# Patient Record
Sex: MALE | URBAN - METROPOLITAN AREA
[De-identification: names, ages, dates, MRNs, and addresses within clinical notes are randomized per-mention and may not be internally consistent; named-entity substitution may affect disease eponyms.]

---

## 2020-10-03 ENCOUNTER — TELEPHONE (OUTPATIENT)
Dept: GASTROENTEROLOGY | Age: 70
End: 2020-10-03

## 2020-10-03 NOTE — TELEPHONE ENCOUNTER
I called, seems someone picked up and hung up. unable to reach by phone. Letter mailed to patient. Copy to Dr Annamaria Ashby.

## 2020-10-03 NOTE — LETTER
10/3/2020 2:29 PM 
 
Mr. Kathe Fernández [address] [place] [address] Dr Joslyn Chambers [id number] Dear Lauryn Gonzales,  
 
Dr Mamadou Cook has referred you for a screening colonoscopy. Please call our office at [phone] so we may discuss this. Sorry we were unable to reach you by phone. Sincerely, Nikita Roblero MD

## 2023-07-15 ENCOUNTER — HOSPITAL ENCOUNTER (EMERGENCY)
Facility: HOSPITAL | Age: 73
Discharge: HOME | End: 2023-07-15
Attending: EMERGENCY MEDICINE
Payer: MEDICARE

## 2023-07-15 ENCOUNTER — APPOINTMENT (OUTPATIENT)
Facility: HOSPITAL | Age: 73
End: 2023-07-15
Payer: MEDICARE

## 2023-07-15 VITALS
TEMPERATURE: 99.1 F | BODY MASS INDEX: 24.5 KG/M2 | OXYGEN SATURATION: 97 % | HEIGHT: 71 IN | WEIGHT: 175 LBS | HEART RATE: 89 BPM | DIASTOLIC BLOOD PRESSURE: 92 MMHG | RESPIRATION RATE: 18 BRPM | SYSTOLIC BLOOD PRESSURE: 144 MMHG
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
## 2023-07-15 PROCEDURE — 73120 X-RAY EXAM OF HAND: CPT

## 2023-07-15 PROCEDURE — 6360000002 HC RX W HCPCS: Performed by: EMERGENCY MEDICINE

## 2023-07-15 PROCEDURE — 99284 EMERGENCY DEPT VISIT MOD MDM: CPT

## 2023-07-15 PROCEDURE — 90471 IMMUNIZATION ADMIN: CPT | Performed by: EMERGENCY MEDICINE

## 2023-07-15 PROCEDURE — 90714 TD VACC NO PRESV 7 YRS+ IM: CPT | Performed by: EMERGENCY MEDICINE

## 2023-07-15 PROCEDURE — 6370000000 HC RX 637 (ALT 250 FOR IP): Performed by: EMERGENCY MEDICINE

## 2023-07-15 RX ORDER — AMOXICILLIN AND CLAVULANATE POTASSIUM 875; 125 MG/1; MG/1
1 TABLET, FILM COATED ORAL 2 TIMES DAILY
Qty: 14 TABLET | Refills: 0 | Status: SHIPPED | OUTPATIENT
Start: 2023-07-15 | End: 2023-07-22

## 2023-07-15 RX ORDER — TETANUS AND DIPHTHERIA TOXOIDS ADSORBED 2; 2 [LF]/.5ML; [LF]/.5ML
0.5 INJECTION INTRAMUSCULAR ONCE
Status: COMPLETED | OUTPATIENT
Start: 2023-07-15 | End: 2023-07-15

## 2023-07-15 RX ORDER — AMOXICILLIN AND CLAVULANATE POTASSIUM 875; 125 MG/1; MG/1
1 TABLET, FILM COATED ORAL
Status: COMPLETED | OUTPATIENT
Start: 2023-07-15 | End: 2023-07-15

## 2023-07-15 RX ADMIN — AMOXICILLIN AND CLAVULANATE POTASSIUM 1 TABLET: 875; 125 TABLET, FILM COATED ORAL at 14:56

## 2023-07-15 RX ADMIN — TETANUS AND DIPHTHERIA TOXOIDS ADSORBED 0.5 ML: 2; 2 INJECTION INTRAMUSCULAR at 14:56

## 2023-07-15 ASSESSMENT — PAIN DESCRIPTION - DESCRIPTORS: DESCRIPTORS: NUMBNESS;TINGLING

## 2023-07-15 ASSESSMENT — PAIN DESCRIPTION - LOCATION: LOCATION: HAND

## 2023-07-15 ASSESSMENT — LIFESTYLE VARIABLES
HOW MANY STANDARD DRINKS CONTAINING ALCOHOL DO YOU HAVE ON A TYPICAL DAY: 3 OR 4
HOW OFTEN DO YOU HAVE A DRINK CONTAINING ALCOHOL: 4 OR MORE TIMES A WEEK

## 2023-07-15 ASSESSMENT — PAIN - FUNCTIONAL ASSESSMENT: PAIN_FUNCTIONAL_ASSESSMENT: 0-10

## 2023-07-15 ASSESSMENT — PAIN DESCRIPTION - ORIENTATION: ORIENTATION: RIGHT;LEFT

## 2023-07-15 ASSESSMENT — PAIN SCALES - GENERAL: PAINLEVEL_OUTOF10: 6

## 2023-07-15 NOTE — DISCHARGE INSTRUCTIONS
Please try to find out the dog's vaccination status. If you are unable to find out the dog is vaccinated against rabies or quarantined the dog and watch it for signs of rabies for 10 days you should come back for rabies vaccination.

## 2023-07-15 NOTE — ED TRIAGE NOTES
Per EMS, pt is intoxicated, got into argument with girlfriend this morning, pts dog bit him in both his hands and forearms. Pt has punctures and lacerations on both forearms and hands. Pt also complaining of numbness in hands. Tetanus was 35 years ago.

## 2023-07-15 NOTE — ED PROVIDER NOTES
[facility] EMERGENCY DEPT  EMERGENCY DEPARTMENT HISTORY AND PHYSICAL EXAM      Date: 7/15/2023  Patient Name: Khloe Alcantara  MRN: [id number]  YOB: 1950  Date of evaluation: 7/15/2023  Provider: Venita Arredondo MD   Note Started: 2:48 PM EDT 7/15/23    HISTORY OF PRESENT ILLNESS     Chief Complaint   Patient presents with    Animal Bite       History Provided By: Patient    HPI: Khloe Alcantara is a 68 y.o. male here with lacerations to his bilateral hands. Patient was bit by his girlfriends dog. They have had this dog from the age of 1 months and is now currently 9 months. It stays at home has always been in their site. He does not know the vaccination status. PAST MEDICAL HISTORY   Past Medical History:  History reviewed. No pertinent past medical history. Past Surgical History:  History reviewed. No pertinent surgical history. Family History:  History reviewed. No pertinent family history. Social History:  Social History     Tobacco Use    Smoking status: Every Day     Packs/day: 0.50     Types: Cigarettes    Smokeless tobacco: Never   Vaping Use    Vaping Use: Never used   Substance Use Topics    Alcohol use: Yes    Drug use: Never       Allergies:  No Known Allergies    PCP: Climmie Gottron, MD    Current Meds:   No current facility-administered medications for this encounter.      Current Outpatient Medications   Medication Sig Dispense Refill    amoxicillin-clavulanate (AUGMENTIN) 875-125 MG per tablet Take 1 tablet by mouth 2 times daily for 7 days 14 tablet 0       Social Determinants of Health:   Social Determinants of Health     Tobacco Use: High Risk    Smoking Tobacco Use: Every Day    Smokeless Tobacco Use: Never    Passive Exposure: Not on file   Alcohol Use: Heavy Drinker    Frequency of Alcohol Consumption: 4 or more times a week    Average Number of Drinks: 3 or 4    Frequency of Binge Drinking: Monthly   Financial Resource Strain: Not on file   Food Insecurity: Not on file

## 2025-07-12 ENCOUNTER — HOSPITAL ENCOUNTER (EMERGENCY)
Facility: HOSPITAL | Age: 75
Discharge: HOME | End: 2025-07-12
Attending: EMERGENCY MEDICINE
Payer: MEDICARE

## 2025-07-12 VITALS
OXYGEN SATURATION: 95 % | RESPIRATION RATE: 18 BRPM | DIASTOLIC BLOOD PRESSURE: 61 MMHG | HEIGHT: 71 IN | SYSTOLIC BLOOD PRESSURE: 136 MMHG | BODY MASS INDEX: 22.82 KG/M2 | WEIGHT: 163 LBS | HEART RATE: 88 BPM | TEMPERATURE: 98.3 F

## 2025-07-12 DIAGNOSIS — R33.9 URINARY RETENTION: Primary | ICD-10-CM

## 2025-07-12 LAB
APPEARANCE UR: CLEAR
BACTERIA URNS QL MICRO: NEGATIVE /HPF
BILIRUB UR QL: NEGATIVE
COLOR UR: ABNORMAL
GLUCOSE UR STRIP.AUTO-MCNC: NEGATIVE MG/DL
HGB UR QL STRIP: ABNORMAL
KETONES UR QL STRIP.AUTO: NEGATIVE MG/DL
LEUKOCYTE ESTERASE UR QL STRIP.AUTO: NEGATIVE
NITRITE UR QL STRIP.AUTO: NEGATIVE
PH UR STRIP: 6 (ref 5–8)
PROT UR STRIP-MCNC: NEGATIVE MG/DL
RBC #/AREA URNS HPF: ABNORMAL /HPF (ref 0–3)
SP GR UR REFRACTOMETRY: <1.005 (ref 1–1.03)
URINE CULTURE IF INDICATED: ABNORMAL
UROBILINOGEN UR QL STRIP.AUTO: 0.2 EU/DL (ref 0.2–1)
WBC URNS QL MICRO: ABNORMAL /HPF (ref 0–5)

## 2025-07-12 PROCEDURE — 51702 INSERT TEMP BLADDER CATH: CPT

## 2025-07-12 PROCEDURE — 81001 URINALYSIS AUTO W/SCOPE: CPT

## 2025-07-12 PROCEDURE — 51798 US URINE CAPACITY MEASURE: CPT

## 2025-07-12 PROCEDURE — 99283 EMERGENCY DEPT VISIT LOW MDM: CPT

## 2025-07-12 RX ORDER — TAMSULOSIN HYDROCHLORIDE 0.4 MG/1
0.4 CAPSULE ORAL DAILY
Qty: 30 CAPSULE | Refills: 0 | Status: SHIPPED | OUTPATIENT
Start: 2025-07-12

## 2025-07-12 RX ORDER — AMLODIPINE BESYLATE 2.5 MG/1
2.5 TABLET ORAL DAILY
COMMUNITY

## 2025-07-12 ASSESSMENT — PAIN SCALES - GENERAL: PAINLEVEL_OUTOF10: 10

## 2025-07-12 ASSESSMENT — LIFESTYLE VARIABLES
HOW MANY STANDARD DRINKS CONTAINING ALCOHOL DO YOU HAVE ON A TYPICAL DAY: PATIENT DOES NOT DRINK
HOW OFTEN DO YOU HAVE A DRINK CONTAINING ALCOHOL: NEVER

## 2025-07-12 ASSESSMENT — PAIN DESCRIPTION - ORIENTATION: ORIENTATION: LOWER

## 2025-07-12 ASSESSMENT — PAIN DESCRIPTION - LOCATION: LOCATION: ABDOMEN

## 2025-07-12 ASSESSMENT — PAIN DESCRIPTION - DESCRIPTORS: DESCRIPTORS: SHARP

## 2025-07-12 ASSESSMENT — PAIN - FUNCTIONAL ASSESSMENT: PAIN_FUNCTIONAL_ASSESSMENT: 0-10

## 2025-07-12 NOTE — ED PROVIDER NOTES
[facility] EMERGENCY DEPT  EMERGENCY DEPARTMENT HISTORY AND PHYSICAL EXAM      Date of evaluation: 7/12/2025  Patient Name: Kemar Thorne  Birthdate 1950  MRN: [id number]  ED Provider: Scott Marquez MD   Note Started: 7:34 AM EDT 7/12/25    HISTORY OF PRESENT ILLNESS     Chief Complaint   Patient presents with    Urinary Retention       History Provided By: Patient, only     HPI: Kemar Thorne is a 75 y.o. male This note was generated using [employer] AI software. If any parties were recorded to generate this note outside of the software user, their consent was obtained prior to recording their voices. All portions of this note were verified by the clinician prior to inclusion in the medical record.    Chief Complaint  - Difficulty urinating for \"2, 3 days\"    History of Present Illness  Mr. Kemar Thorne, a 75-year-old male, presents to the ED with difficulty urinating for approximately 3 weeks, which worsened this morning. The patient reports being unable to urinate for the past 2-3 days. He states he \"thought I could do it, but I couldn't do it.\" Mr. Thorne denies any known kidney problems, nausea, or vomiting. He also denies any history of prostate problems or similar urinary issues in the past.    Review of Systems  All pertinent positives and negatives from ROS in the HPI.    Review of Systems  Gastrointestinal: Negative for nausea, vomiting.  Genitourinary: Positive for difficulty urinating.    Medical History  - Urinary retention for approximately 2-3 days    Surgical History  - Suprapubic catheter placement approximately 3 weeks ago    PAST MEDICAL HISTORY   Past Medical History:  Past Medical History:   Diagnosis Date    Hypertension        Past Surgical History:  History reviewed. No pertinent surgical history.    Family History:  History reviewed. No pertinent family history.    Social History:  Social History     Tobacco Use    Smoking status: Every Day     Current packs/day: 0.50     Types:  [facility] ([facility]  - None    Critical Care  None     Vitals:    Vitals:    07/12/25 0537 07/12/25 0546 07/12/25 0630   BP: (!) 208/107 (!) 206/110 136/61   Pulse: 88     Resp: 18     Temp: 98.3 °F (36.8 °C)     TempSrc: Oral     SpO2: 95%     Weight: 73.9 kg (163 lb)     Height: 1.803 m (5' 11\")         ED COURSE       Patient was given the following medications:  Medications - No data to display    CONSULTS: See ED Course/MDM for further details.  None   PROCEDURES   Unless otherwise noted above, none  Procedures    CLINICAL IMPRESSIONS     1. Urinary retention       SDOH/DISPOSITION/PLAN   Social Determinants affecting Treatment Plan: None    DISPOSITION Decision To Discharge 07/12/2025 06:21:25 AM   DISPOSITION CONDITION Stable         Discharge Note: The patient is stable for discharge home. The signs, symptoms, diagnosis, and discharge instructions have been discussed, understanding conveyed, and agreed upon. The patient is to follow up as recommended or return to ER should their symptoms worsen.      PATIENT REFERRED TO:  Raul Hernandez MD  [address] [id number] [phone]    Schedule an appointment as soon as possible for a visit in 1 week  for followup    [facility]  [address] [place] [address]  [address]  [place] [address]  [phone]  Schedule an appointment as soon as possible for a visit in 1 week  Call our -7 [facility] Hotline. [phone]. We're here to help.        DISCHARGE MEDICATIONS:     Medication List        START taking these medications      tamsulosin 0.4 MG capsule  Commonly known as: FLOMAX  Take 1 capsule by mouth daily            ASK your doctor about these medications      amLODIPine 2.5 MG tablet  Commonly known as: NORVASC               Where to Get Your Medications        [facility] [address] [place], [place] - [address] - P [phone] - F [phone]  [address] [place] [id number]      Phone: [phone]   tamsulosin

## 2025-07-12 NOTE — DISCHARGE INSTRUCTIONS
Thank you for choosing our Emergency Department for your care.  It is our privilege to care for you in your time of need.  In the next several days, you may receive a survey via email or mailed to your home about your experience with our team.  We would greatly appreciate you taking a few minutes to complete the survey, as we use this information to learn what we have done well and what we could be doing better. Thank you for trusting us with your care!    Below you will find a list of your tests from today's visit.   Labs and Radiology Studies  Recent Results (from the past 12 hours)   Urinalysis with Reflex to Culture    Collection Time: 07/12/25  6:00 AM    Specimen: Urine   Result Value Ref Range    Color, UA Yellow/Straw      Appearance Clear Clear      Specific Gravity, UA <1.005 1.003 - 1.030    pH, Urine 6.0 5.0 - 8.0      Protein, UA Negative Negative mg/dL    Glucose, Ur Negative Negative mg/dL    Ketones, Urine Negative Negative mg/dL    Bilirubin, Urine Negative Negative      Blood, Urine Large (A) Negative      Urobilinogen, Urine 0.2 0.2 - 1.0 EU/dL    Nitrite, Urine Negative Negative      Leukocyte Esterase, Urine Negative Negative      WBC, UA 0-4 0 - 5 /hpf    RBC, UA 10-20 0 - 3 /hpf    BACTERIA, URINE Negative Negative /hpf    Urine Culture if Indicated Culture not indicated by UA result Culture not indicated by UA result       No results found.  ------------------------------------------------------------------------------------------------------------  The evaluation and treatment you received in the Emergency Department were for an urgent problem. It is important that you follow-up with a doctor, nurse practitioner, or physician assistant to:  (1) confirm your diagnosis,  (2) re-evaluation of changes in your illness and treatment, and (3) for ongoing care. Please take your discharge instructions with you when you go to your follow-up appointment.     If you have any problem arranging a

## 2025-07-12 NOTE — ED TRIAGE NOTES
Pt arrives via EMS with CC difficulty urinating \"off and on\" x3 weeks. States he was unable to pee this morning and was experiencing increased suprapubic pain with distension.    Stable at this time.  Does remain on Keppra.  He is followed by neurology but I refilled his Keppra for him.

## 2025-08-03 ENCOUNTER — HOSPITAL ENCOUNTER (EMERGENCY)
Facility: HOSPITAL | Age: 75
Discharge: HOME | End: 2025-08-03
Attending: EMERGENCY MEDICINE
Payer: MEDICARE

## 2025-08-03 VITALS
RESPIRATION RATE: 16 BRPM | DIASTOLIC BLOOD PRESSURE: 96 MMHG | SYSTOLIC BLOOD PRESSURE: 162 MMHG | TEMPERATURE: 98.1 F | OXYGEN SATURATION: 99 % | HEART RATE: 90 BPM

## 2025-08-03 DIAGNOSIS — R33.9 URINARY RETENTION: Primary | ICD-10-CM

## 2025-08-03 LAB
APPEARANCE UR: CLEAR
BACTERIA URNS QL MICRO: NEGATIVE /HPF
BILIRUB UR QL: NEGATIVE
COLOR UR: ABNORMAL
GLUCOSE UR STRIP.AUTO-MCNC: NEGATIVE MG/DL
HGB UR QL STRIP: ABNORMAL
KETONES UR QL STRIP.AUTO: 15 MG/DL
LEUKOCYTE ESTERASE UR QL STRIP.AUTO: NEGATIVE
NITRITE UR QL STRIP.AUTO: NEGATIVE
PH UR STRIP: 5.5 (ref 5–8)
PROT UR STRIP-MCNC: NEGATIVE MG/DL
RBC #/AREA URNS HPF: ABNORMAL /HPF (ref 0–3)
SP GR UR REFRACTOMETRY: 1.01 (ref 1–1.03)
UROBILINOGEN UR QL STRIP.AUTO: 0.2 EU/DL (ref 0.2–1)
WBC URNS QL MICRO: ABNORMAL /HPF (ref 0–5)

## 2025-08-03 PROCEDURE — 51702 INSERT TEMP BLADDER CATH: CPT

## 2025-08-03 PROCEDURE — 99283 EMERGENCY DEPT VISIT LOW MDM: CPT

## 2025-08-03 PROCEDURE — 81001 URINALYSIS AUTO W/SCOPE: CPT

## 2025-08-03 RX ORDER — DOXYCYCLINE 100 MG/1
100 CAPSULE ORAL 2 TIMES DAILY
COMMUNITY

## 2025-08-03 RX ORDER — TRIAMCINOLONE ACETONIDE 1 MG/G
OINTMENT TOPICAL PRN
COMMUNITY
Start: 2025-07-08

## 2025-08-03 RX ORDER — SILDENAFIL 50 MG/1
50 TABLET, FILM COATED ORAL PRN
COMMUNITY

## 2025-08-03 ASSESSMENT — PAIN - FUNCTIONAL ASSESSMENT: PAIN_FUNCTIONAL_ASSESSMENT: 0-10

## 2025-08-03 ASSESSMENT — LIFESTYLE VARIABLES
HOW OFTEN DO YOU HAVE A DRINK CONTAINING ALCOHOL: MONTHLY OR LESS
HOW MANY STANDARD DRINKS CONTAINING ALCOHOL DO YOU HAVE ON A TYPICAL DAY: 1 OR 2

## 2025-08-03 ASSESSMENT — PAIN SCALES - GENERAL: PAINLEVEL_OUTOF10: 9

## 2025-08-03 ASSESSMENT — PAIN DESCRIPTION - FREQUENCY: FREQUENCY: CONTINUOUS

## 2025-08-03 ASSESSMENT — PAIN DESCRIPTION - LOCATION: LOCATION: ABDOMEN

## 2025-08-03 ASSESSMENT — PAIN DESCRIPTION - PAIN TYPE: TYPE: ACUTE PAIN

## 2025-08-03 ASSESSMENT — PAIN DESCRIPTION - ORIENTATION: ORIENTATION: LOWER

## 2025-08-04 ASSESSMENT — ENCOUNTER SYMPTOMS: ABDOMINAL PAIN: 1
